# Patient Record
(demographics unavailable — no encounter records)

---

## 2024-11-04 NOTE — ASSESSMENT
[FreeTextEntry1] : Mr. Roldan is a 69-year-old man with history of dyslipidemia, hypertension, and hypothyroidism presenting for evaluation of dyspnea on exertion.  Impression: (1) Occlusive CAD with severe plaque burden: CAC 1243 and mod mLAD and severe pRDA disease, both CT-FFR negative, asymptomatic, stable (2) Dyslipidemia, LDL 89 before the change in statin dose (3) HTN, well controlled on amlodipine (4) Hypothyroidism, on levothyroxine (5) Family history of premature ASCVD  Plan: - Discussed his CAD diagnosis in detail including use of diagrams/drawings. Discussed management including medication and lifestyle modification as outlined below. Discussed indications for stress testing and/or PCI. Discussed signs/symptoms of heart attack with instructions to go to the nearest ED. - Continue ASA, statin, BB, CCB. - Primordial and primary ASCVD risk prevention was discussed. Patient was informed of the "Essential 8" guidelines for health including: (1) physical activity (>150min moderate or >75min vigorous exercise per week); (2) diet [I recommend a pesco-Mediterranean diet with ~16 hours of daily fasting]; (3) adequate/good quality sleep; (4) weight loss (BMI <25); (5) avoidance/cessation of smoking; (6) cholesterol (LDL as low as possible; TChol <200); (7) BP control (<120/80); (8) glucose control (fasting BG <100). Source: https://www.ahajournals.org/doi/epdf/10.1161/CIR.4174236420333530  RTC 3 months

## 2024-11-04 NOTE — HISTORY OF PRESENT ILLNESS
[FreeTextEntry1] : Mr. Roldan is a 69-year-old man with history of dyslipidemia, hypertension, and hypothyroidism presenting for evaluation of dyspnea on exertion.  At baseline reports being in good health. Recently was seen by his PCP who noted he was on the cusp of diabetes and since that time he has made significant lifestyle changes with resultant 10-15lbs of weight loss.  CCTA here showed severe RPDA and moderate mLAD disease, both FFR negative. There was severe plaque burden with a CAC of 1243.  No overt chest pain. His father had an MI in his 40s.  ECG with NSR, normal axis, normal intervals, no ischemic changes.  CCTA 10/2024: CAC 1243, , RI 30, LCx 125, . Severe stenosis in the RPDA due to focal calcified plaque, moderate stenosis in the mLAD due to diffuse plaque. CT-FFR negative for hemodynamically signifiacnt focal lesion.  Labs: - , HDL 36, , LDL 89 - Cr 0.90, K 3.9, normal transaminases - A1c 6.3%, TSH 2.99 - Hgb 15.2, Plt 206  Labs: - Aspirin 81mg - Amlodipine-Atorvastatin 10-40mg - Metoprolol succinate 25mg daily - Levothyroxine 50mcg - Escitalopram 10mg

## 2025-02-03 NOTE — ASSESSMENT
[FreeTextEntry1] : Mr. Roldan is a 69-year-old man with history of dyslipidemia, hypertension, and hypothyroidism presenting for evaluation of dyspnea on exertion.  Impression: (1) Occlusive CAD with severe plaque burden: CAC 1243 and mod mLAD and severe pRDA disease, both CT-FFR negative (2) Dyslipidemia, LDL 89 before the change in statin dose (3) HTN, well controlled on amlodipine (4) Hypothyroidism, on levothyroxine (5) Family history of premature ASCVD  Plan: - Today has bradycardia while on a beta blocker and notes some mild dyspnea with stairs. Will discontinue metoprolol and start Imdur in it's place as a second antianginal. 3 day event monitor placed to assess for bradyarrhythmia. Follow up in 1-2 weeks to assess for changes in dyspnea and review event monitor. - Discussed his CAD diagnosis in detail including use of diagrams/drawings. Discussed management including medication and lifestyle modification as outlined below. Discussed indications for stress testing and/or PCI. Discussed signs/symptoms of heart attack with instructions to go to the nearest ED. - Primordial and primary ASCVD risk prevention was discussed. Patient was informed of the "Essential 8" guidelines for health including: (1) physical activity (>150min moderate or >75min vigorous exercise per week); (2) diet [I recommend a pesco-Mediterranean diet with ~16 hours of daily fasting]; (3) adequate/good quality sleep; (4) weight loss (BMI <25); (5) avoidance/cessation of smoking; (6) cholesterol (LDL as low as possible; TChol <200); (7) BP control (<120/80); (8) glucose control (fasting BG <100). Source: https://www.ahajournals.org/doi/epdf/10.1161/CIR.[email or web address]  RTC 3 months

## 2025-02-03 NOTE — HISTORY OF PRESENT ILLNESS
[FreeTextEntry1] : Mr. Roldan is a 69-year-old man with history of CAD, dyslipidemia, hypertension, and hypothyroidism presenting for follow up.  At baseline reports being in good health. Recently was seen by his PCP who noted he was on the cusp of diabetes and since that time he has made significant lifestyle changes with resultant 10-15lbs of weight loss.  CCTA here showed severe RPDA and moderate mLAD disease, both FFR negative. There was severe plaque burden with a CAC of 1243.  Today ECG shows sinus bradycardia at 39bpm. He denies chest pain but does occasionally get short of breath with stairs.  His father had an MI in his 40s.  CCTA 10/2024: CAC 1243, , RI 30, LCx 125, . Severe stenosis in the RPDA due to focal calcified plaque, moderate stenosis in the mLAD due to diffuse plaque. CT-FFR negative for hemodynamically signifiacnt focal lesion.  Labs: - , HDL 36, , LDL 89 -> LDL 78 - Cr 0.90, K 3.9, normal transaminases - A1c 6.3%-> 5.8%, TSH 2.99, lp(a) 28nmol/L - Hgb 15.2, Plt 206  Labs: - Aspirin 81mg - Amlodipine-Atorvastatin 10-40mg - Metoprolol succinate 25mg daily - Levothyroxine 50mcg - Escitalopram 10mg